# Patient Record
Sex: FEMALE | Race: WHITE | URBAN - METROPOLITAN AREA
[De-identification: names, ages, dates, MRNs, and addresses within clinical notes are randomized per-mention and may not be internally consistent; named-entity substitution may affect disease eponyms.]

---

## 2023-06-06 ENCOUNTER — APPOINTMENT (OUTPATIENT)
Dept: CT IMAGING | Age: 83
DRG: 872 | End: 2023-06-06
Payer: MEDICARE

## 2023-06-06 ENCOUNTER — APPOINTMENT (OUTPATIENT)
Dept: GENERAL RADIOLOGY | Age: 83
DRG: 872 | End: 2023-06-06
Payer: MEDICARE

## 2023-06-06 ENCOUNTER — HOSPITAL ENCOUNTER (INPATIENT)
Age: 83
LOS: 1 days | Discharge: HOSPICE | DRG: 872 | End: 2023-06-07
Attending: EMERGENCY MEDICINE | Admitting: INTERNAL MEDICINE
Payer: MEDICARE

## 2023-06-06 DIAGNOSIS — N17.9 AKI (ACUTE KIDNEY INJURY) (HCC): ICD-10-CM

## 2023-06-06 DIAGNOSIS — R00.0 TACHYCARDIA: ICD-10-CM

## 2023-06-06 DIAGNOSIS — A41.9 SEPTICEMIA (HCC): ICD-10-CM

## 2023-06-06 DIAGNOSIS — E87.1 HYPONATREMIA: ICD-10-CM

## 2023-06-06 DIAGNOSIS — R16.0 LIVER MASS: ICD-10-CM

## 2023-06-06 DIAGNOSIS — R10.11 ABDOMINAL PAIN, RIGHT UPPER QUADRANT: Primary | ICD-10-CM

## 2023-06-06 LAB
ALBUMIN SERPL-MCNC: 2.4 G/DL (ref 3.4–5)
ALBUMIN/GLOB SERPL: 0.5 {RATIO} (ref 1.1–2.2)
ALP SERPL-CCNC: 234 U/L (ref 40–129)
ALT SERPL-CCNC: 38 U/L (ref 10–40)
ANION GAP SERPL CALCULATED.3IONS-SCNC: 18 MMOL/L (ref 3–16)
AST SERPL-CCNC: 50 U/L (ref 15–37)
BASOPHILS # BLD: 0 K/UL (ref 0–0.2)
BASOPHILS NFR BLD: 0.2 %
BILIRUB SERPL-MCNC: 0.6 MG/DL (ref 0–1)
BUN SERPL-MCNC: 34 MG/DL (ref 7–20)
CALCIUM SERPL-MCNC: 8.1 MG/DL (ref 8.3–10.6)
CHLORIDE SERPL-SCNC: 93 MMOL/L (ref 99–110)
CO2 SERPL-SCNC: 16 MMOL/L (ref 21–32)
CREAT SERPL-MCNC: 1.9 MG/DL (ref 0.6–1.2)
DEPRECATED RDW RBC AUTO: 18.2 % (ref 12.4–15.4)
EOSINOPHIL # BLD: 0 K/UL (ref 0–0.6)
EOSINOPHIL NFR BLD: 0 %
GFR SERPLBLD CREATININE-BSD FMLA CKD-EPI: 26 ML/MIN/{1.73_M2}
GLUCOSE SERPL-MCNC: 261 MG/DL (ref 70–99)
HCT VFR BLD AUTO: 33.5 % (ref 36–48)
HGB BLD-MCNC: 10.5 G/DL (ref 12–16)
LACTATE BLDV-SCNC: 3.1 MMOL/L (ref 0.4–1.9)
LACTATE BLDV-SCNC: 4.6 MMOL/L (ref 0.4–1.9)
LYMPHOCYTES # BLD: 1.7 K/UL (ref 1–5.1)
LYMPHOCYTES NFR BLD: 10.3 %
MCH RBC QN AUTO: 24 PG (ref 26–34)
MCHC RBC AUTO-ENTMCNC: 31.5 G/DL (ref 31–36)
MCV RBC AUTO: 76.1 FL (ref 80–100)
MONOCYTES # BLD: 0.8 K/UL (ref 0–1.3)
MONOCYTES NFR BLD: 4.8 %
NEUTROPHILS # BLD: 13.7 K/UL (ref 1.7–7.7)
NEUTROPHILS NFR BLD: 84.7 %
PLATELET # BLD AUTO: 389 K/UL (ref 135–450)
PMV BLD AUTO: 8.5 FL (ref 5–10.5)
POTASSIUM SERPL-SCNC: 3.6 MMOL/L (ref 3.5–5.1)
PROT SERPL-MCNC: 7.4 G/DL (ref 6.4–8.2)
RBC # BLD AUTO: 4.39 M/UL (ref 4–5.2)
SODIUM SERPL-SCNC: 127 MMOL/L (ref 136–145)
TROPONIN, HIGH SENSITIVITY: 13 NG/L (ref 0–14)
WBC # BLD AUTO: 16.2 K/UL (ref 4–11)

## 2023-06-06 PROCEDURE — 96367 TX/PROPH/DG ADDL SEQ IV INF: CPT

## 2023-06-06 PROCEDURE — 96361 HYDRATE IV INFUSION ADD-ON: CPT

## 2023-06-06 PROCEDURE — 87150 DNA/RNA AMPLIFIED PROBE: CPT

## 2023-06-06 PROCEDURE — 6360000002 HC RX W HCPCS: Performed by: PHYSICIAN ASSISTANT

## 2023-06-06 PROCEDURE — 71250 CT THORAX DX C-: CPT

## 2023-06-06 PROCEDURE — 99285 EMERGENCY DEPT VISIT HI MDM: CPT

## 2023-06-06 PROCEDURE — 71045 X-RAY EXAM CHEST 1 VIEW: CPT

## 2023-06-06 PROCEDURE — 84443 ASSAY THYROID STIM HORMONE: CPT

## 2023-06-06 PROCEDURE — 84484 ASSAY OF TROPONIN QUANT: CPT

## 2023-06-06 PROCEDURE — 2500000003 HC RX 250 WO HCPCS: Performed by: PHYSICIAN ASSISTANT

## 2023-06-06 PROCEDURE — 87186 SC STD MICRODIL/AGAR DIL: CPT

## 2023-06-06 PROCEDURE — 85025 COMPLETE CBC W/AUTO DIFF WBC: CPT

## 2023-06-06 PROCEDURE — 96365 THER/PROPH/DIAG IV INF INIT: CPT

## 2023-06-06 PROCEDURE — 2060000000 HC ICU INTERMEDIATE R&B

## 2023-06-06 PROCEDURE — 2580000003 HC RX 258: Performed by: PHYSICIAN ASSISTANT

## 2023-06-06 PROCEDURE — 83605 ASSAY OF LACTIC ACID: CPT

## 2023-06-06 PROCEDURE — 83930 ASSAY OF BLOOD OSMOLALITY: CPT

## 2023-06-06 PROCEDURE — 80053 COMPREHEN METABOLIC PANEL: CPT

## 2023-06-06 PROCEDURE — 93005 ELECTROCARDIOGRAM TRACING: CPT | Performed by: PHYSICIAN ASSISTANT

## 2023-06-06 PROCEDURE — 2580000003 HC RX 258: Performed by: NURSE PRACTITIONER

## 2023-06-06 PROCEDURE — 36415 COLL VENOUS BLD VENIPUNCTURE: CPT

## 2023-06-06 PROCEDURE — 87040 BLOOD CULTURE FOR BACTERIA: CPT

## 2023-06-06 RX ORDER — 0.9 % SODIUM CHLORIDE 0.9 %
1000 INTRAVENOUS SOLUTION INTRAVENOUS ONCE
Status: COMPLETED | OUTPATIENT
Start: 2023-06-06 | End: 2023-06-06

## 2023-06-06 RX ORDER — SODIUM CHLORIDE 9 MG/ML
INJECTION, SOLUTION INTRAVENOUS CONTINUOUS
Status: DISCONTINUED | OUTPATIENT
Start: 2023-06-06 | End: 2023-06-07

## 2023-06-06 RX ORDER — ENOXAPARIN SODIUM 100 MG/ML
30 INJECTION SUBCUTANEOUS DAILY
Status: DISCONTINUED | OUTPATIENT
Start: 2023-06-07 | End: 2023-06-06

## 2023-06-06 RX ORDER — SODIUM CHLORIDE 0.9 % (FLUSH) 0.9 %
5-40 SYRINGE (ML) INJECTION PRN
Status: DISCONTINUED | OUTPATIENT
Start: 2023-06-06 | End: 2023-06-08 | Stop reason: HOSPADM

## 2023-06-06 RX ORDER — SODIUM CHLORIDE 9 MG/ML
INJECTION, SOLUTION INTRAVENOUS PRN
Status: DISCONTINUED | OUTPATIENT
Start: 2023-06-06 | End: 2023-06-08 | Stop reason: HOSPADM

## 2023-06-06 RX ORDER — HEPARIN SODIUM 5000 [USP'U]/ML
5000 INJECTION, SOLUTION INTRAVENOUS; SUBCUTANEOUS 2 TIMES DAILY
Status: DISCONTINUED | OUTPATIENT
Start: 2023-06-07 | End: 2023-06-08 | Stop reason: HOSPADM

## 2023-06-06 RX ORDER — ONDANSETRON 2 MG/ML
4 INJECTION INTRAMUSCULAR; INTRAVENOUS EVERY 6 HOURS PRN
Status: DISCONTINUED | OUTPATIENT
Start: 2023-06-06 | End: 2023-06-08 | Stop reason: HOSPADM

## 2023-06-06 RX ORDER — LEVOFLOXACIN 5 MG/ML
500 INJECTION, SOLUTION INTRAVENOUS ONCE
Status: COMPLETED | OUTPATIENT
Start: 2023-06-06 | End: 2023-06-06

## 2023-06-06 RX ORDER — SODIUM CHLORIDE 9 MG/ML
30 INJECTION, SOLUTION INTRAVENOUS ONCE
Status: DISCONTINUED | OUTPATIENT
Start: 2023-06-06 | End: 2023-06-06

## 2023-06-06 RX ORDER — POLYETHYLENE GLYCOL 3350 17 G/17G
17 POWDER, FOR SOLUTION ORAL DAILY PRN
Status: DISCONTINUED | OUTPATIENT
Start: 2023-06-06 | End: 2023-06-08 | Stop reason: HOSPADM

## 2023-06-06 RX ORDER — INSULIN LISPRO 100 [IU]/ML
0-4 INJECTION, SOLUTION INTRAVENOUS; SUBCUTANEOUS NIGHTLY
Status: DISCONTINUED | OUTPATIENT
Start: 2023-06-06 | End: 2023-06-08 | Stop reason: HOSPADM

## 2023-06-06 RX ORDER — DEXTROSE MONOHYDRATE 100 MG/ML
INJECTION, SOLUTION INTRAVENOUS CONTINUOUS PRN
Status: DISCONTINUED | OUTPATIENT
Start: 2023-06-06 | End: 2023-06-08 | Stop reason: HOSPADM

## 2023-06-06 RX ORDER — MORPHINE SULFATE 2 MG/ML
2 INJECTION, SOLUTION INTRAMUSCULAR; INTRAVENOUS
Status: DISCONTINUED | OUTPATIENT
Start: 2023-06-06 | End: 2023-06-08 | Stop reason: HOSPADM

## 2023-06-06 RX ORDER — 0.9 % SODIUM CHLORIDE 0.9 %
800 INTRAVENOUS SOLUTION INTRAVENOUS ONCE
Status: COMPLETED | OUTPATIENT
Start: 2023-06-06 | End: 2023-06-06

## 2023-06-06 RX ORDER — METRONIDAZOLE 500 MG/100ML
500 INJECTION, SOLUTION INTRAVENOUS ONCE
Status: COMPLETED | OUTPATIENT
Start: 2023-06-06 | End: 2023-06-06

## 2023-06-06 RX ORDER — INSULIN LISPRO 100 [IU]/ML
0-4 INJECTION, SOLUTION INTRAVENOUS; SUBCUTANEOUS
Status: DISCONTINUED | OUTPATIENT
Start: 2023-06-07 | End: 2023-06-08 | Stop reason: HOSPADM

## 2023-06-06 RX ORDER — ONDANSETRON 4 MG/1
4 TABLET, ORALLY DISINTEGRATING ORAL EVERY 8 HOURS PRN
Status: DISCONTINUED | OUTPATIENT
Start: 2023-06-06 | End: 2023-06-08 | Stop reason: HOSPADM

## 2023-06-06 RX ORDER — ACETAMINOPHEN 650 MG/1
650 SUPPOSITORY RECTAL EVERY 6 HOURS PRN
Status: DISCONTINUED | OUTPATIENT
Start: 2023-06-06 | End: 2023-06-08 | Stop reason: HOSPADM

## 2023-06-06 RX ORDER — SODIUM CHLORIDE 0.9 % (FLUSH) 0.9 %
5-40 SYRINGE (ML) INJECTION EVERY 12 HOURS SCHEDULED
Status: DISCONTINUED | OUTPATIENT
Start: 2023-06-06 | End: 2023-06-08 | Stop reason: HOSPADM

## 2023-06-06 RX ORDER — ACETAMINOPHEN 325 MG/1
650 TABLET ORAL EVERY 6 HOURS PRN
Status: DISCONTINUED | OUTPATIENT
Start: 2023-06-06 | End: 2023-06-08 | Stop reason: HOSPADM

## 2023-06-06 RX ORDER — HYDROCODONE BITARTRATE AND ACETAMINOPHEN 5; 325 MG/1; MG/1
1 TABLET ORAL EVERY 6 HOURS PRN
Status: DISCONTINUED | OUTPATIENT
Start: 2023-06-06 | End: 2023-06-08 | Stop reason: HOSPADM

## 2023-06-06 RX ADMIN — SODIUM CHLORIDE: 9 INJECTION, SOLUTION INTRAVENOUS at 21:48

## 2023-06-06 RX ADMIN — METRONIDAZOLE 500 MG: 500 INJECTION, SOLUTION INTRAVENOUS at 18:49

## 2023-06-06 RX ADMIN — SODIUM CHLORIDE 1000 ML: 9 INJECTION, SOLUTION INTRAVENOUS at 16:14

## 2023-06-06 RX ADMIN — LEVOFLOXACIN 500 MG: 500 INJECTION, SOLUTION INTRAVENOUS at 16:50

## 2023-06-06 RX ADMIN — SODIUM CHLORIDE 800 ML: 9 INJECTION, SOLUTION INTRAVENOUS at 16:52

## 2023-06-06 ASSESSMENT — PAIN - FUNCTIONAL ASSESSMENT: PAIN_FUNCTIONAL_ASSESSMENT: 0-10

## 2023-06-06 ASSESSMENT — ENCOUNTER SYMPTOMS
COUGH: 0
ABDOMINAL PAIN: 1
CONSTIPATION: 0
SORE THROAT: 0
EYE PAIN: 0
VOMITING: 0
SHORTNESS OF BREATH: 1
NAUSEA: 1
BACK PAIN: 0
RHINORRHEA: 0
DIARRHEA: 0

## 2023-06-06 ASSESSMENT — PAIN DESCRIPTION - DESCRIPTORS: DESCRIPTORS: ACHING

## 2023-06-06 ASSESSMENT — LIFESTYLE VARIABLES
HOW MANY STANDARD DRINKS CONTAINING ALCOHOL DO YOU HAVE ON A TYPICAL DAY: PATIENT DOES NOT DRINK
HOW OFTEN DO YOU HAVE A DRINK CONTAINING ALCOHOL: NEVER

## 2023-06-06 ASSESSMENT — PAIN SCALES - GENERAL: PAINLEVEL_OUTOF10: 10

## 2023-06-06 ASSESSMENT — PAIN DESCRIPTION - LOCATION: LOCATION: ABDOMEN

## 2023-06-06 NOTE — ED NOTES
Pt, pts daughter and son discussing options for Hospice and DNR-CC. Pt wants DNR-CC and [facility] [address] [place] [address]. Pt had a consult with [facility] [address] [place] [address] yesterday. ANDRESSA Restrepo made aware.      Robbie Conn RN  06/06/23 0024

## 2023-06-06 NOTE — ED PROVIDER NOTES
EMERGENCY MEDICINE ATTENDING NOTE  Nishi Gillette. Chata Delgadillo., , PRUDENCIO, FAAEM        I have independently seen/performed a substantive portion of the visit (history, physical, and MDM) on Chrissy Newman. All diagnostic, treatment, and disposition decisions were made by myself in conjunction with the advanced practice provider. I have participated in the medical decision making and directed the treatment plan and disposition of the patient. For further details of [facility] emergency department encounter, please see the advanced practice provider's documentation. Shefali Gilbert DO, Carvin Blue, celia the primary physician provider of record. CHIEF COMPLAINT  Chief Complaint   Patient presents with    Abdominal Pain     Bottom right side under ribcage x 3 weeks ago after vomited with force pain started. Weakness. BRIEF HISTORY  Margoth Feliz is a [de-identified] y.o. female  who presents to the ED for evaluation of right upper quadrant abdominal pain has been going on for couple weeks. Having some nausea and vomiting with it as well. On arrival patient is noted to be quite hypotensive. States she has not had any medical care in almost a decade. BRIEF/FOCUSED PHYSICAL EXAMINATION  VITAL SIGNS:    ED Triage Vitals [06/06/23 1517]   Enc Vitals Group      BP (!) 83/35      Pulse (!) 125      Respirations 20      Temp 97.8 °F (36.6 °C)      Temp Source Oral      SpO2 97 %      Weight - Scale 130 lb (59 kg)      Height 5' 3\" (1.6 m)      Head Circumference       Peak Flow       Pain Score       Pain Loc       Pain Edu? Excl. in [address]? Physical Exam  Vitals and nursing note reviewed. Constitutional:       General: She is not in acute distress. Appearance: She is ill-appearing and toxic-appearing. HENT:      Head: Normocephalic and atraumatic. Abdominal:      Tenderness: There is abdominal tenderness in the right upper quadrant.    Neurological:      Mental Status: She is
Physical exam (11/08/10), Ruptured, appendix (1944), and Weight gain, abnormal.     Chronic Conditions affecting Care:     EMERGENCY DEPARTMENT COURSE and DIFFERENTIAL DIAGNOSIS/MDM:   Vitals:    Vitals:    06/06/23 1848 06/06/23 1858 06/06/23 1918 06/06/23 1930   BP: (!) 101/59 (!) 107/54 96/60 99/66   Pulse: 74 74 71 72   Resp: 20 24 (!) 32 24   Temp:       TempSrc:       SpO2: 98% 97% 97% 98%   Weight:       Height:           Patient was given the following medications:  Medications   0.9 % sodium chloride bolus (0 mLs IntraVENous Stopped 6/6/23 1715)   levoFLOXacin (LEVAQUIN) 500 MG/100ML infusion 500 mg (0 mg IntraVENous Stopped 6/6/23 1750)   metronidazole (FLAGYL) 500 mg in 0.9% NaCl 100 mL IVPB premix (500 mg IntraVENous New Bag 6/6/23 1849)   0.9 % sodium chloride bolus (0 mLs IntraVENous Stopped 6/6/23 1723)             Is this patient to be included in the SEP-1 Core Measure due to severe sepsis or septic shock? Yes   SEP-1 CORE MEASURE DATA      Sepsis Criteria   Severe Sepsis Criteria   Septic Shock Criteria     Must be confirmed or suspected to move forward with diagnosis of sepsis. Must meet 2:    [] Temperature > 100.9 F (38.3 C)        or < 96.8 F (36 C)  [x] HR > 90  [] RR > 20  [x] WBC > 12 or < 4 or 10% bands      AND:      [x] Infection Confirmed or        Suspected. Must meet 1:    [x] Lactate > 2       or   [] Signs of Organ Dysfunction:    - SBP < 90 or MAP < 65  - Altered mental status  - Creatinine > 2 or increased from      baseline  - Urine Output < 0.5 ml/kg/hr  - Bilirubin > 2  - INR > 1.5 (not anticoagulated)  - Platelets < 247,058  - Acute Respiratory Failure as     evidenced by new need for NIPPV     or mechanical ventilation      [] No criteria met for Severe Sepsis.    Must meet 1:    [x] Lactate > 4        or   [] SBP < 90 or MAP < 65 for at        least two readings in the first        hour after fluid bolus        administration      [] Vasopressors initiated (if

## 2023-06-06 NOTE — ED NOTES
Patient alert and oriented x4. GCS 15/15. Skin appropriate for ethnicity, dry and intact. No signs of acute distress noted at this time. Regular respiratory pattern, normal respiratory depth, unlabored respirations. denies pain. Pt placed on a continuous pulse oximetry and telemetry monitoring. Bedside Monitor on with Alarms audible and alarms set. Pt on cycling blood pressure. Fall risk precautions in place, call light in reach, bed side table within reach, bed alarm on, will continue to monitor. Warm blanket provided to pt. Call light in reach.        Sacha Cunningham RN  06/06/23 0196

## 2023-06-07 VITALS
HEIGHT: 63 IN | DIASTOLIC BLOOD PRESSURE: 57 MMHG | SYSTOLIC BLOOD PRESSURE: 117 MMHG | WEIGHT: 109 LBS | RESPIRATION RATE: 16 BRPM | TEMPERATURE: 97.6 F | HEART RATE: 74 BPM | BODY MASS INDEX: 19.31 KG/M2 | OXYGEN SATURATION: 98 %

## 2023-06-07 PROBLEM — N20.0 STAGHORN CALCULUS: Status: ACTIVE | Noted: 2023-06-07

## 2023-06-07 PROBLEM — N17.9 AKI (ACUTE KIDNEY INJURY) (HCC): Status: ACTIVE | Noted: 2023-06-07

## 2023-06-07 PROBLEM — R16.0 LIVER MASS: Status: ACTIVE | Noted: 2023-06-07

## 2023-06-07 PROBLEM — E87.20 METABOLIC ACIDOSIS: Status: ACTIVE | Noted: 2023-06-07

## 2023-06-07 PROBLEM — E87.1 HYPONATREMIA: Status: ACTIVE | Noted: 2023-06-07

## 2023-06-07 LAB
ALBUMIN SERPL-MCNC: 1.6 G/DL (ref 3.4–5)
ALBUMIN/GLOB SERPL: 0.4 {RATIO} (ref 1.1–2.2)
ALP SERPL-CCNC: 179 U/L (ref 40–129)
ALT SERPL-CCNC: 28 U/L (ref 10–40)
ANION GAP SERPL CALCULATED.3IONS-SCNC: 11 MMOL/L (ref 3–16)
ANION GAP SERPL CALCULATED.3IONS-SCNC: 12 MMOL/L (ref 3–16)
ANION GAP SERPL CALCULATED.3IONS-SCNC: 12 MMOL/L (ref 3–16)
AST SERPL-CCNC: 32 U/L (ref 15–37)
BACTERIA URNS QL MICRO: ABNORMAL /HPF
BASOPHILS # BLD: 0 K/UL (ref 0–0.2)
BASOPHILS NFR BLD: 0.1 %
BILIRUB SERPL-MCNC: 0.5 MG/DL (ref 0–1)
BILIRUB UR QL STRIP.AUTO: NEGATIVE
BUN SERPL-MCNC: 30 MG/DL (ref 7–20)
BUN SERPL-MCNC: 31 MG/DL (ref 7–20)
BUN SERPL-MCNC: 31 MG/DL (ref 7–20)
CALCIUM SERPL-MCNC: 7.1 MG/DL (ref 8.3–10.6)
CALCIUM SERPL-MCNC: 7.5 MG/DL (ref 8.3–10.6)
CALCIUM SERPL-MCNC: 7.6 MG/DL (ref 8.3–10.6)
CHLORIDE SERPL-SCNC: 102 MMOL/L (ref 99–110)
CHLORIDE SERPL-SCNC: 104 MMOL/L (ref 99–110)
CHLORIDE SERPL-SCNC: 106 MMOL/L (ref 99–110)
CHLORIDE UR-SCNC: <20 MMOL/L
CLARITY UR: ABNORMAL
CO2 SERPL-SCNC: 17 MMOL/L (ref 21–32)
CO2 SERPL-SCNC: 18 MMOL/L (ref 21–32)
CO2 SERPL-SCNC: 19 MMOL/L (ref 21–32)
COLOR UR: YELLOW
CREAT SERPL-MCNC: 1.5 MG/DL (ref 0.6–1.2)
CREAT SERPL-MCNC: 1.7 MG/DL (ref 0.6–1.2)
CREAT SERPL-MCNC: 1.7 MG/DL (ref 0.6–1.2)
DEPRECATED RDW RBC AUTO: 17.8 % (ref 12.4–15.4)
EKG ATRIAL RATE: 115 BPM
EKG DIAGNOSIS: NORMAL
EKG P AXIS: 35 DEGREES
EKG P-R INTERVAL: 120 MS
EKG Q-T INTERVAL: 304 MS
EKG QRS DURATION: 76 MS
EKG QTC CALCULATION (BAZETT): 420 MS
EKG R AXIS: 55 DEGREES
EKG T AXIS: 60 DEGREES
EKG VENTRICULAR RATE: 115 BPM
EOSINOPHIL # BLD: 0 K/UL (ref 0–0.6)
EOSINOPHIL NFR BLD: 0.2 %
EPI CELLS #/AREA URNS AUTO: 2 /HPF (ref 0–5)
GFR SERPLBLD CREATININE-BSD FMLA CKD-EPI: 30 ML/MIN/{1.73_M2}
GFR SERPLBLD CREATININE-BSD FMLA CKD-EPI: 30 ML/MIN/{1.73_M2}
GFR SERPLBLD CREATININE-BSD FMLA CKD-EPI: 35 ML/MIN/{1.73_M2}
GLUCOSE BLD-MCNC: 132 MG/DL (ref 70–99)
GLUCOSE BLD-MCNC: 77 MG/DL (ref 70–99)
GLUCOSE BLD-MCNC: 77 MG/DL (ref 70–99)
GLUCOSE BLD-MCNC: 81 MG/DL (ref 70–99)
GLUCOSE SERPL-MCNC: 73 MG/DL (ref 70–99)
GLUCOSE SERPL-MCNC: 83 MG/DL (ref 70–99)
GLUCOSE SERPL-MCNC: 89 MG/DL (ref 70–99)
GLUCOSE UR STRIP.AUTO-MCNC: NEGATIVE MG/DL
HCT VFR BLD AUTO: 29.1 % (ref 36–48)
HGB BLD-MCNC: 8.8 G/DL (ref 12–16)
HGB UR QL STRIP.AUTO: ABNORMAL
HYALINE CASTS #/AREA URNS AUTO: 12 /LPF (ref 0–8)
KETONES UR STRIP.AUTO-MCNC: NEGATIVE MG/DL
LEUKOCYTE ESTERASE UR QL STRIP.AUTO: ABNORMAL
LYMPHOCYTES # BLD: 1.4 K/UL (ref 1–5.1)
LYMPHOCYTES NFR BLD: 10.4 %
MCH RBC QN AUTO: 23.3 PG (ref 26–34)
MCHC RBC AUTO-ENTMCNC: 30.3 G/DL (ref 31–36)
MCV RBC AUTO: 76.9 FL (ref 80–100)
MONOCYTES # BLD: 0.6 K/UL (ref 0–1.3)
MONOCYTES NFR BLD: 4.9 %
NEUTROPHILS # BLD: 11 K/UL (ref 1.7–7.7)
NEUTROPHILS NFR BLD: 84.4 %
NITRITE UR QL STRIP.AUTO: NEGATIVE
OSMOLALITY SERPL: 296 MOSM/KG (ref 280–301)
OSMOLALITY UR: 237 MOSM/KG (ref 390–1070)
PERFORMED ON: ABNORMAL
PERFORMED ON: NORMAL
PH UR STRIP.AUTO: 6.5 [PH] (ref 5–8)
PLATELET # BLD AUTO: 282 K/UL (ref 135–450)
PMV BLD AUTO: 8.5 FL (ref 5–10.5)
POTASSIUM SERPL-SCNC: 3.4 MMOL/L (ref 3.5–5.1)
POTASSIUM SERPL-SCNC: 3.7 MMOL/L (ref 3.5–5.1)
POTASSIUM SERPL-SCNC: 3.7 MMOL/L (ref 3.5–5.1)
PROT SERPL-MCNC: 5.9 G/DL (ref 6.4–8.2)
PROT UR STRIP.AUTO-MCNC: 100 MG/DL
RBC # BLD AUTO: 3.79 M/UL (ref 4–5.2)
RBC CLUMPS #/AREA URNS AUTO: 17 /HPF (ref 0–4)
REPORT: NORMAL
SODIUM SERPL-SCNC: 132 MMOL/L (ref 136–145)
SODIUM SERPL-SCNC: 134 MMOL/L (ref 136–145)
SODIUM SERPL-SCNC: 135 MMOL/L (ref 136–145)
SODIUM UR-SCNC: <20 MMOL/L
SP GR UR STRIP.AUTO: 1.01 (ref 1–1.03)
TSH SERPL DL<=0.005 MIU/L-ACNC: 1.88 UIU/ML (ref 0.27–4.2)
UA COMPLETE W REFLEX CULTURE PNL UR: YES
UA DIPSTICK W REFLEX MICRO PNL UR: YES
URN SPEC COLLECT METH UR: ABNORMAL
UROBILINOGEN UR STRIP-ACNC: 1 E.U./DL
WBC # BLD AUTO: 13 K/UL (ref 4–11)
WBC #/AREA URNS AUTO: 2565 /HPF (ref 0–5)

## 2023-06-07 PROCEDURE — 36415 COLL VENOUS BLD VENIPUNCTURE: CPT

## 2023-06-07 PROCEDURE — 87077 CULTURE AEROBIC IDENTIFY: CPT

## 2023-06-07 PROCEDURE — 81003 URINALYSIS AUTO W/O SCOPE: CPT

## 2023-06-07 PROCEDURE — 80048 BASIC METABOLIC PNL TOTAL CA: CPT

## 2023-06-07 PROCEDURE — 2580000003 HC RX 258: Performed by: INTERNAL MEDICINE

## 2023-06-07 PROCEDURE — 85025 COMPLETE CBC W/AUTO DIFF WBC: CPT

## 2023-06-07 PROCEDURE — 82436 ASSAY OF URINE CHLORIDE: CPT

## 2023-06-07 PROCEDURE — 83935 ASSAY OF URINE OSMOLALITY: CPT

## 2023-06-07 PROCEDURE — 6360000002 HC RX W HCPCS: Performed by: NURSE PRACTITIONER

## 2023-06-07 PROCEDURE — 2580000003 HC RX 258: Performed by: NURSE PRACTITIONER

## 2023-06-07 PROCEDURE — 93010 ELECTROCARDIOGRAM REPORT: CPT | Performed by: INTERNAL MEDICINE

## 2023-06-07 PROCEDURE — 84300 ASSAY OF URINE SODIUM: CPT

## 2023-06-07 PROCEDURE — 87086 URINE CULTURE/COLONY COUNT: CPT

## 2023-06-07 PROCEDURE — 87186 SC STD MICRODIL/AGAR DIL: CPT

## 2023-06-07 PROCEDURE — 80053 COMPREHEN METABOLIC PANEL: CPT

## 2023-06-07 RX ORDER — LEVOFLOXACIN 500 MG/1
500 TABLET, FILM COATED ORAL DAILY
Qty: 7 TABLET | Refills: 0 | Status: SHIPPED | OUTPATIENT
Start: 2023-06-07 | End: 2023-06-14

## 2023-06-07 RX ORDER — LEVOFLOXACIN 5 MG/ML
750 INJECTION, SOLUTION INTRAVENOUS
Status: DISCONTINUED | OUTPATIENT
Start: 2023-06-08 | End: 2023-06-08 | Stop reason: HOSPADM

## 2023-06-07 RX ORDER — SODIUM CHLORIDE, SODIUM LACTATE, POTASSIUM CHLORIDE, CALCIUM CHLORIDE 600; 310; 30; 20 MG/100ML; MG/100ML; MG/100ML; MG/100ML
INJECTION, SOLUTION INTRAVENOUS CONTINUOUS
Status: DISCONTINUED | OUTPATIENT
Start: 2023-06-07 | End: 2023-06-08 | Stop reason: HOSPADM

## 2023-06-07 RX ADMIN — HEPARIN SODIUM 5000 UNITS: 5000 INJECTION INTRAVENOUS; SUBCUTANEOUS at 09:45

## 2023-06-07 RX ADMIN — SODIUM CHLORIDE, PRESERVATIVE FREE 10 ML: 5 INJECTION INTRAVENOUS at 16:05

## 2023-06-07 RX ADMIN — MORPHINE SULFATE 2 MG: 2 INJECTION, SOLUTION INTRAMUSCULAR; INTRAVENOUS at 21:23

## 2023-06-07 RX ADMIN — MORPHINE SULFATE 2 MG: 2 INJECTION, SOLUTION INTRAMUSCULAR; INTRAVENOUS at 13:00

## 2023-06-07 RX ADMIN — SODIUM CHLORIDE: 9 INJECTION, SOLUTION INTRAVENOUS at 11:45

## 2023-06-07 RX ADMIN — SODIUM CHLORIDE, POTASSIUM CHLORIDE, SODIUM LACTATE AND CALCIUM CHLORIDE: 600; 310; 30; 20 INJECTION, SOLUTION INTRAVENOUS at 14:30

## 2023-06-07 RX ADMIN — SODIUM CHLORIDE, PRESERVATIVE FREE 10 ML: 5 INJECTION INTRAVENOUS at 13:00

## 2023-06-07 RX ADMIN — MORPHINE SULFATE 2 MG: 2 INJECTION, SOLUTION INTRAMUSCULAR; INTRAVENOUS at 16:05

## 2023-06-07 ASSESSMENT — PAIN DESCRIPTION - ORIENTATION
ORIENTATION: RIGHT

## 2023-06-07 ASSESSMENT — PAIN DESCRIPTION - LOCATION
LOCATION: ABDOMEN;FLANK
LOCATION: ABDOMEN

## 2023-06-07 ASSESSMENT — PAIN SCALES - GENERAL
PAINLEVEL_OUTOF10: 7
PAINLEVEL_OUTOF10: 7
PAINLEVEL_OUTOF10: 4
PAINLEVEL_OUTOF10: 7
PAINLEVEL_OUTOF10: 2

## 2023-06-07 ASSESSMENT — PAIN DESCRIPTION - DESCRIPTORS: DESCRIPTORS: ACHING

## 2023-06-07 NOTE — DISCHARGE SUMMARY
Hospital Medicine Discharge Summary    Patient ID: Karoline Brown      Patient's PCP: No primary care provider on file. Admit Date: 6/6/2023     Discharge Date:   6/7/2023     Admitting Provider: Ludivina Quijano MD     Discharge Provider: Delmi Seals MD     Discharge Diagnoses: Active Hospital Problems    Diagnosis     EMMETT (acute kidney injury) ([address] [J96.2]     Metabolic acidosis [A43.01]     Hyponatremia [E87.1]     Liver mass [R16.0]     Large Right Renal Staghorn calculus [N20.0]     Sepsis ([address] [A41.9]        The patient was seen and examined on day of discharge and this discharge summary is in conjunction with any daily progress note from day of discharge. Hospital Course: The patient is a 82-year-old lady with history of DDD, chronic back pain who presented with generalized malaise and weakness, abdominal pain worse on the right side with failure to thrive with associated nausea and vomiting found to have sepsis due to infected right renal calculus complicated by acute kidney injury with metabolic acidosis and liver mass of unclear etiology  Patient did not want to pursue any surgical intervention for her staghorn calculus with infection, neither did she want any invasive procedures for her liver mass. She opted for hospice and requested hospice consult who came to evaluate the patient and accepted the patient to inpatient hospice. We will discharge to hospice        Physical Exam Performed:     /63   Pulse 76   Temp 97.5 °F (36.4 °C) (Oral)   Resp 16   Ht 5' 3\" (1.6 m)   Wt 109 lb (49.4 kg)   SpO2 98%   BMI 19.31 kg/m²     General appearance:  No apparent distress, appears stated age and cooperative. HEENT:  Normal cephalic, atraumatic without obvious deformity. Pupils equal, round, and reactive to light. Extra ocular muscles intact. Conjunctivae/corneas clear. Neck: Supple, with full range of motion. No jugular venous distention.  Trachea

## 2023-06-07 NOTE — PROGRESS NOTES
Pt admitted from ED to 3T at this time. Pt oriented to unit at this time and hooked up to tele monitor. Pt VSS on RA. Pt c/o RUQ pain that feels uncomfortable but pt declines pain medication at this time. Pt stating to family and this RN \" I am just ready to go and die, I just want to be comfortable\". Pt and pt family aware of hospice consult for the AM.    Pt skin WDL. Dryness on bilateral lower legs.

## 2023-06-07 NOTE — ACP (ADVANCE CARE PLANNING)
Advanced Care Planning Note. Purpose of Encounter: Advanced care planning in light of admission and progressive decline in health   Parties In Attendance: Patient,  2 sons , Kosta Alamo, NP   Decisional Capacity: Yes  Goals of Care Determination: Patient/POA wishes to focus on comfort, but does want IV fluids and antibiotics at this time. Plan:  Palliative care consult  Code Status: DNR CCA.    Time spent on Advanced care Planning: [address]   RAMSEY Mack CNP  6/6/2023 10:13 PM

## 2023-06-07 NOTE — DISCHARGE INSTR - COC
Continuity of Care Form    Patient Name: Sanjana Kennedy   :  1940  MRN:  [id number]    Admit date:  2023  Discharge date:  2023    Code Status Order: DNR-CCA   Advance Directives:     Admitting Physician:  Ishan Austin MD  PCP: No primary care provider on file.     Discharging Nurse: [address] Unit/Room#: 7MJ-5390/9444-31  Discharging Unit Phone Number: [phone]    Emergency Contact:   Extended Emergency Contact Information  Primary Emergency Contact: Sean Newman  Address: [address] Phone: [phone]  Mobile Phone: [phone]  Relation: Child  Secondary Emergency Contact: [address] [place] [address] Phone: [phone]  Work Phone: [phone]  Relation: Child    Past Surgical History:  Past Surgical History:   Procedure Laterality Date    APPENDECTOMY      JOINT REPLACEMENT Left     about 8 years ago    TONSILLECTOMY      TUBAL LIGATION         Immunization History:   Immunization History   Administered Date(s) Administered    DTP 2010    Influenza Vaccine, unspecified formulation 10/22/2015    Pneumococcal, PPSV23, PNEUMOVAX 23, (age 2y+), SC/IM, 0.5mL 2008    Zoster Live (Zostavax) 2015       Active Problems:  Patient Active Problem List   Diagnosis Code    DDD (degenerative disc disease) [id number]    Sepsis ([facility] A41.9    EMMETT (acute kidney injury) ([facility] E16.2    Metabolic acidosis A73.86    Hyponatremia E87.1    Liver mass R16.0    Large Right Renal Staghorn calculus N20.0       Isolation/Infection:   Isolation            No Isolation          Patient Infection Status       None to display            Nurse Assessment:  Last Vital Signs: /63   Pulse 76   Temp 97.5 °F (36.4 °C) (Oral)   Resp 16   Ht 5' 3\" (1.6 m)   Wt 109 lb (49.4 kg)   SpO2 98%   BMI 19.31 kg/m²     Last documented pain score (0-10 scale): Pain Level: 7  Last Weight:   Wt Readings from Last 1 Encounters:   23 109 lb (49.4 kg)     Mental

## 2023-06-07 NOTE — PROGRESS NOTES
Patient seen in ED, room 08. Admission completed with the following exceptions:  4 Eyes Assessment, Immunizations, Covid Vaccines, Rights and Responsibilities, Orientation to room, Plan of Care, Education/Learning Assessment and Education Plan, white board, height and weight, pain assessment and head to toe assessment. Patient is alert and oriented X 4. Patient lives alone in a two story home and is being admitted for Sepsis. Home Medications as well as Outside Sources has been verbally reviewed with patient and updated as appropriate and is now Completed. Plan of care updated if indicated. All questions answered. Patient family is at bedside. Patient does live alone and at baseline monsivais snot use a walker or cane. Patient states due to her fatigue she has been needing assistance with her basic ADL's. Patient currently does not have any assistance in the home. Patient agrees as well as family to a  consult for options.  consult placed.

## 2023-06-07 NOTE — CONSULTS
characterized with elective MR imaging      IMPRESSION/RECOMMENDATIONS:     EMMETT, ? CKD- last crea was 2016. Has not been following up with any physician since according to her she has been \"disenchanted with health care system\". May have underlying CKD. EMMETT probably from prerenal azotemia+obstruction. Switch to LR at 100ml/hr. Hyponatremia-Na up to 135 from 127. Unclear onset. Follow with LR. BMP every 6 hours. Would not increase serum Na more than 135meq for today. Low serum HCO3-+anion and non-anion gap. Follow with LR and improvement in renal function. Check VBG. Sepsis-in setting of uti and right calculus-seen by Urology. Patient wants to hold off on stenting. On Levofloxacin. Hypocalcemia- check ionized Ca  Anemia- follow Hgb  Patient does not want to pursue HD if need arises. High risk and would need close f/u. Thank you for the consult. We will follow this patient along the hospitalization.     Amanda Reyes MD
kidney. No routine follow-up imaging is recommended. GI/Bowel: Small hiatal hernia is present. No evidence of small-bowel obstruction is present. Scattered colonic diverticula are noted, without evidence of diverticulitis. No CT findings are present to suggest appendicitis. Pelvis: Evaluation of the pelvis is limited due to streak artifact from the left hip prosthesis. Air-fluid levels noted within the urinary bladder, and may be related to recent catheterization versus infection secondary to a gas-forming organism. The uterus and adnexal regions appear grossly normal. Peritoneum/Retroperitoneum: No evidence of abdominal aortic aneurysm formation is present. Nonspecific periportal lymph nodes are present. No free fluid or free air is noted within the abdomen or pelvis. Bones/Soft Tissues: Osseous structures are osteopenic. Degenerative changes are noted within the spine. Patient is status post prior total left hip arthroplasty, without evidence of hardware complication. No acute osseous abnormality is present. Lipomatous changes are present within the left gluteal muscles. 1. Approximate 9 cm mass within the right lobe of the liver, highly suspicious for primary hepatoma. Differential considerations would include metastatic disease versus gallbladder carcinoma invading the liver. Lesion would be amenable to CT-guided percutaneous biopsy for histological evaluation additional low-attenuation liver lesions are present, suspicious for metastatic disease. Gallbladder is suboptimally visualized. 2. Large right renal staghorn calculus, filling the right collecting system 3. Punctate nonobstructing left intrarenal calculi 4.  Indeterminate bilateral adrenal lesions, which can be further characterized with elective MR imaging            Electronically signed by: RAMSEY Pugh CNP, 6/7/2023   [facility]  Office contact: [phone]

## 2023-06-07 NOTE — PROGRESS NOTES
Clinical Pharmacy Note: Positive Blood Culture Documentation    Pharmacy was notified by lab that Andre Keys has positive blood cultures. Patient is positive for Proteus species   in one out of two sets. Resistance markers present: none    Name of lab: [facility]  Name of receiving pharmacist: Charly Riley  Time: 7002    Patient's current antimicrobial regimen of levaquin does not provide appropriate empiric coverage. Cefepime was recommended       Dr. Jennifer Lockwood was notified of the positive result at .     New antimicrobials initiated after physician discussion: none, patient going to hospice    Thank you,  Kala Melissa, PharmD, [facility]  [id number]

## 2023-06-07 NOTE — PROGRESS NOTES
Taqueria Avery  PT/OT reviewed patient's chart, orders appreciated. At this time, following discussion with patient, she is most focused on remaining comfortable in her current condition and is looking forward to consult with hospice. She is not interested in therapy services at this time, therefore, we will be signing off. Thank you. Marlon Oliva PT, DPT, [id number]  [address]

## 2023-06-07 NOTE — PLAN OF CARE
Problem: Safety - Adult  Goal: Free from fall injury  6/7/2023 1155 by Carmel Orourke RN  Outcome: Progressing  Note: Patient remains absent from falls at this time. Remains alert and oriented, in bed with call light and belongings in reach. Non-slip footwear on and 2/4 siderails raised. Bed remains in lowest/locked position at all times with alarm activated. Fall precautions in place. Family at bedside. Patient encouraged to use call light to request assistance, v/u.  Will continue to monitor. Problem: Hematologic - Adult  Goal: Maintains hematologic stability  Outcome: Progressing  Note: Patient VSS at this time on room air. Will continue to monitor.

## 2023-06-07 NOTE — CARE COORDINATION
Melissa DUNAWAY RN working on getting pt to [facility] today for pain control. Cm did meet with pt and daughter and provided [facility] pamphlet for AL or LTC assistance if pt does not remain in hospice.      Trixie oLmbardi RN, BSN  [phone]

## 2023-06-07 NOTE — PROGRESS NOTES
Hospice [facility]    Met with patient and family to discuss hospice services, hospice philosophy, levels of care and locations of care. Patient would like to enroll into hospice services and transfer to inpatient unit for pain management before going to LTC facility. Transportation scheduled with [address] for 930/10pm.    Thank you for including us in Chrissy's care.     Narayan Villatoro RN  [facility] #: [phone]  Cell: [phone]

## 2023-06-07 NOTE — PROGRESS NOTES
Hospitalist Progress Note      PCP: No primary care provider on file. Date of Admission: 6/6/2023    LOS: 1    Chief Complaint:   Chief Complaint   Patient presents with    Abdominal Pain     Bottom right side under ribcage x 3 weeks ago after vomited with force pain started. Weakness. Case Summary:   82-year-old lady with history of DDD, chronic back pain who presented with generalized malaise and weakness, abdominal pain worse on the right side with failure to thrive with associated nausea and vomiting found to have sepsis due to infected right renal calculus complicated by acute kidney injury with metabolic acidosis and liver mass of unclear etiology      Active Hospital Problems    Diagnosis Date Noted    EMMETT (acute kidney injury) ([facility] [N17.9] 56/62/5440    Metabolic acidosis [C71.06] 06/07/2023    Hyponatremia [E87.1] 06/07/2023    Liver mass [R16.0] 06/07/2023    Large Right Renal Staghorn calculus [N20.0] 06/07/2023    Sepsis ([facility] [A41.9] 06/06/2023         Principal Problem:    Sepsis due to UTI: Patient with staghorn calculus likely infected stone. - Continue IV antibiotics  - Monitor hemodynamics      Large Right Renal Staghorn calculus: Probable infected kidney stone. Continue IV antibiotics. Patient consulted by urology however she does not want to pursue any surgical interventions at this time and would prefer hospice care      Liver mass: Unclear etiology. Patient has a expressed wish not to pursue invasive procedures. Active Problems:    EMMETT (acute kidney injury) with Metabolic acidosis: Likely prerenal azotemia. Possibly chronic. Seen nephrology and started on hydration. However given the patient does not want to pursue any invasive procedures, we will seek hospice consult. Monitor renal function and electrolytes. Nephrology following with recommendations. Hyponatremia: At 131.   Continue IV hydration and monitor        Medications:  Reviewed  Infusion Medications

## 2023-06-07 NOTE — H&P
within the right collecting system, filling the renal pelvis and calyces. Calculus measures at least 5 mm. Cortical cyst formation is present on the left kidney. No routine follow-up imaging is recommended. GI/Bowel: Small hiatal hernia is present. No evidence of small-bowel obstruction is present. Scattered colonic diverticula are noted, without evidence of diverticulitis. No CT findings are present to suggest appendicitis. Pelvis: Evaluation of the pelvis is limited due to streak artifact from the left hip prosthesis. Air-fluid levels noted within the urinary bladder, and may be related to recent catheterization versus infection secondary to a gas-forming organism. The uterus and adnexal regions appear grossly normal. Peritoneum/Retroperitoneum: No evidence of abdominal aortic aneurysm formation is present. Nonspecific periportal lymph nodes are present. No free fluid or free air is noted within the abdomen or pelvis. Bones/Soft Tissues: Osseous structures are osteopenic. Degenerative changes are noted within the spine. Patient is status post prior total left hip arthroplasty, without evidence of hardware complication. No acute osseous abnormality is present. Lipomatous changes are present within the left gluteal muscles. 1. Approximate 9 cm mass within the right lobe of the liver, highly suspicious for primary hepatoma. Differential considerations would include metastatic disease versus gallbladder carcinoma invading the liver. Lesion would be amenable to CT-guided percutaneous biopsy for histological evaluation additional low-attenuation liver lesions are present, suspicious for metastatic disease. Gallbladder is suboptimally visualized. 2. Large right renal staghorn calculus, filling the right collecting system 3. Punctate nonobstructing left intrarenal calculi 4.  Indeterminate bilateral adrenal lesions, which can be further characterized with elective MR imaging         Electronically

## 2023-06-07 NOTE — PROGRESS NOTES
Nephrology consult received. 27-year-old lady presenting with abdominal pain hypotension acute kidney injury and electrolyte abnormality. Treatment plan reviewed. Continue IV fluid. Further recommendations follow.   Thanks for the consult

## 2023-06-08 NOTE — CARE COORDINATION
Note created on 6/8/2023 at 8:29 AM    RUSS returned call to Jennie Glez from [address] [place] [address] [phone] updating that pt had discharged with hospice last night and to follow up with family. Her message to RUSS had been that family had been looking into AL with them but facility is not sure if it would be appropriate and were going to send a nurse out today to meet with pt.       Julieth Stratton RN, BSN  [phone]

## 2023-06-08 NOTE — PROGRESS NOTES
Pt discharged at this time with transport and IV in place per hospice consult orders. Pt sent with discharge packet and pt son at bedside. Pt alert/oriented x4 on RA and all questions answered.

## 2023-06-09 LAB
BACTERIA BLD CULT: ABNORMAL
BACTERIA BLD CULT: ABNORMAL
BACTERIA UR CULT: ABNORMAL
ORGANISM: ABNORMAL

## 2023-06-10 LAB — BACTERIA BLD CULT ORG #2: NORMAL

## 2024-05-31 ENCOUNTER — HOSPITAL ENCOUNTER (OUTPATIENT)
Dept: CT IMAGING | Age: 84
Discharge: HOME | End: 2024-05-31
Attending: INTERNAL MEDICINE
Payer: MEDICARE

## 2024-05-31 DIAGNOSIS — R16.0 HEPATOMEGALY: ICD-10-CM

## 2024-05-31 LAB
PERFORMED ON: ABNORMAL
POC CREATININE: 1.2 MG/DL (ref 0.6–1.2)
POC SAMPLE TYPE: ABNORMAL

## 2024-05-31 PROCEDURE — 6360000004 HC RX CONTRAST MEDICATION: Performed by: INTERNAL MEDICINE

## 2024-05-31 PROCEDURE — 74177 CT ABD & PELVIS W/CONTRAST: CPT

## 2024-05-31 PROCEDURE — 82565 ASSAY OF CREATININE: CPT

## 2024-05-31 RX ADMIN — IOPAMIDOL 75 ML: 755 INJECTION, SOLUTION INTRAVENOUS at 15:47
